# Patient Record
Sex: MALE | Race: WHITE | Employment: FULL TIME | URBAN - METROPOLITAN AREA
[De-identification: names, ages, dates, MRNs, and addresses within clinical notes are randomized per-mention and may not be internally consistent; named-entity substitution may affect disease eponyms.]

---

## 2018-02-26 ENCOUNTER — OFFICE VISIT (OUTPATIENT)
Dept: FAMILY MEDICINE | Facility: CLINIC | Age: 43
End: 2018-02-26

## 2018-02-26 VITALS
OXYGEN SATURATION: 98 % | SYSTOLIC BLOOD PRESSURE: 130 MMHG | BODY MASS INDEX: 28.04 KG/M2 | RESPIRATION RATE: 16 BRPM | DIASTOLIC BLOOD PRESSURE: 82 MMHG | HEART RATE: 65 BPM | TEMPERATURE: 96.8 F | HEIGHT: 72 IN | WEIGHT: 207 LBS

## 2018-02-26 DIAGNOSIS — R07.9 CHEST PAIN, UNSPECIFIED TYPE: ICD-10-CM

## 2018-02-26 DIAGNOSIS — I10 BENIGN ESSENTIAL HYPERTENSION: Primary | ICD-10-CM

## 2018-02-26 PROCEDURE — 99203 OFFICE O/P NEW LOW 30 MIN: CPT | Performed by: PHYSICIAN ASSISTANT

## 2018-02-26 PROCEDURE — 93000 ELECTROCARDIOGRAM COMPLETE: CPT | Performed by: PHYSICIAN ASSISTANT

## 2018-02-26 RX ORDER — LISINOPRIL 10 MG/1
10 TABLET ORAL DAILY
Qty: 30 TABLET | Refills: 5 | Status: SHIPPED | OUTPATIENT
Start: 2018-02-26 | End: 2018-10-20

## 2018-02-26 NOTE — PROGRESS NOTES
"  SUBJECTIVE:   Ramírez Epperson is a 42 year old male who presents to clinic today for the following health issues:      Hypertension Follow-up      Outpatient blood pressures are being checked at home.  Results are patient states that they where high but now reduce.    Low Salt Diet: low salt      Amount of exercise or physical activity: 4-5 days/week for an average of 30-45 minutes    Problems taking medications regularly: No    Medication side effects: none    Diet: regular (no restrictions)            Problem list and histories reviewed & adjusted, as indicated.  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  [facility], and noticed BP was quite high.  Was started on lisinopril and has been rechecked each month.  He did get some labs from them as well.  Everything looked good.      Started on Friday, he has noticed a little bit of chest tightness.  He recently stared a new work out routine.  Denies any SOB, EDEN.      Blood pressure does run in his family.  There is some heart disease as well.      BP Readings from Last 3 Encounters:   02/26/18 130/82    Wt Readings from Last 3 Encounters:   02/26/18 207 lb (93.9 kg)                    Reviewed and updated as needed this visit by clinical staff  Tobacco  Allergies  Meds  Med Hx  Surg Hx  Fam Hx  Soc Hx      Reviewed and updated as needed this visit by Provider         ROS:  Constitutional, HEENT, cardiovascular, pulmonary, gi and gu systems are negative, except as otherwise noted.    OBJECTIVE:     /82  Pulse 65  Temp 96.8  F (36  C) (Oral)  Resp 16  Ht 5' 11.75\" (1.822 m)  Wt 207 lb (93.9 kg)  SpO2 98%  BMI 28.27 kg/m2  Body mass index is 28.27 kg/(m^2).  GENERAL: alert and no distress  EYES: Eyes grossly normal to inspection  HENT: ear canals and TM's normal, nose and mouth without ulcers or lesions  NECK: no adenopathy, no asymmetry, masses, or " scars and thyroid normal to palpation  RESP: lungs clear to auscultation - no rales, rhonchi or wheezes  CV: regular rate and rhythm, normal S1 S2, no S3 or S4, no murmur, click or rub, no peripheral edema and peripheral pulses strong  PSYCH: mentation appears normal, affect normal/bright    Diagnostic Test Results:  EKG - NSR    ASSESSMENT/PLAN:             1. Benign essential hypertension  At goal.  Dietary and lifestyle changes discussed.  Will have him bring in copies of his recent labs.  - EKG 12-lead complete w/read - Clinics  - lisinopril (PRINIVIL/ZESTRIL) 10 MG tablet; Take 1 tablet (10 mg) by mouth daily  Dispense: 30 tablet; Refill: 5    2. Chest pain, unspecified type  Would think this is more muscular.  Normal EKG.  - EKG 12-lead complete w/read - Clinics    Follow up 6 months.      Paul Aguilar PA-C  [facility]

## 2018-02-26 NOTE — MR AVS SNAPSHOT
"              After Visit Summary   2018    Ramírez Epperson    MRN: [id number]           Patient Information     Date Of Birth          1975        Visit Information        Provider Department      2018 10:40 AM Paul Aguilar PA-C [facility]        Today's Diagnoses     Benign essential hypertension    -  1    Chest pain, unspecified type           Follow-ups after your visit        Who to contact     If you have questions or need follow up information about today's clinic visit or your schedule please contact [facility] directly at [phone].  Normal or non-critical lab and imaging results will be communicated to you by Joyenthart, letter or phone within 4 business days after the clinic has received the results. If you do not hear from us within 7 days, please contact the clinic through Joyenthart or phone. If you have a critical or abnormal lab result, we will notify you by phone as soon as possible.  Submit refill requests through GoMoto or call your pharmacy and they will forward the refill request to us. Please allow 3 business days for your refill to be completed.          Additional Information About Your Visit        MyChart Information     GoMoto lets you send messages to your doctor, view your test results, renew your prescriptions, schedule appointments and more. To sign up, go to [email or web address] . Click on \"Log in\" on the left side of the screen, which will take you to the Welcome page. Then click on \"Sign up Now\" on the right side of the page.     You will be asked to enter the access code listed below, as well as some personal information. Please follow the directions to create your username and password.     Your access code is: [email or web address]  Expires: 2018 11:13 AM     Your access code will  in 90 days. If you need help or a new code, please call your [place] clinic or [phone].        Care EveryWhere ID     This is your Care " "EveryWhere ID. This could be used by other organizations to access your [facility] medical records  [id number]        Your Vitals Were     Pulse Temperature Respirations Height Pulse Oximetry BMI (Body Mass Index)    65 96.8  F (36  C) (Oral) 16 5' 11.75\" (1.822 m) 98% 28.27 kg/m2       Blood Pressure from Last 3 Encounters:   02/26/18 130/82    Weight from Last 3 Encounters:   02/26/18 207 lb (93.9 kg)              We Performed the Following     EKG 12-lead complete w/read - Clinics          Today's Medication Changes          These changes are accurate as of 2/26/18 11:13 AM.  If you have any questions, ask your nurse or doctor.               These medicines have changed or have updated prescriptions.        Dose/Directions    * LISINOPRIL PO   This may have changed:  Another medication with the same name was added. Make sure you understand how and when to take each.   Changed by:  Paul Aguilar PA-C        Dose:  10 mg   Take 10 mg by mouth daily   Refills:  0       * lisinopril 10 MG tablet   Commonly known as:  PRINIVIL/ZESTRIL   This may have changed:  You were already taking a medication with the same name, and this prescription was added. Make sure you understand how and when to take each.   Used for:  Benign essential hypertension   Changed by:  Paul Aguilar PA-C        Dose:  10 mg   Take 1 tablet (10 mg) by mouth daily   Quantity:  30 tablet   Refills:  5       * Notice:  This list has 2 medication(s) that are the same as other medications prescribed for you. Read the directions carefully, and ask your doctor or other care provider to review them with you.         Where to get your medicines      These medications were sent to [id number] [place] - [address]  [address] [facility] [address]     Phone:  [phone]     lisinopril 10 MG tablet                Primary Care Provider Office Phone # Fax #    [facility] [phone] [address]  [facility] [address]        Equal Access to Services     ANUPAMESTELLE MAUDE : Hadii payal ku hadprincesso Soshayyali, waaxda luqadaha, qaybta kaalmada betymay, ness nance madiaisha lymanerikawerner rodriguez. So [facility] [phone].    ATENCIÓN: Si habla español, tiene a anderson disposición servicios gratuitos de asistencia lingüística. [facility] [phone].    We comply with applicable federal civil rights laws and [place] laws. We do not discriminate on the basis of race, color, national origin, age, disability, sex, sexual orientation, or gender identity.            Thank you!     Thank you for choosing [facility]  for your care. Our goal is always to provide you with excellent care. Hearing back from our patients is one way we can continue to improve our services. Please take a few minutes to complete the written survey that you may receive in the mail after your visit with us. Thank you!             Your Updated Medication List - Protect others around you: Learn how to safely use, store and throw away your medicines at www.disposemymeds.org.          This list is accurate as of 2/26/18 11:13 AM.  Always use your most recent med list.                   Brand Name Dispense Instructions for use Diagnosis    * LISINOPRIL PO      Take 10 mg by mouth daily        * lisinopril 10 MG tablet    PRINIVIL/ZESTRIL    30 tablet    Take 1 tablet (10 mg) by mouth daily    Benign essential hypertension       * Notice:  This list has 2 medication(s) that are the same as other medications prescribed for you. Read the directions carefully, and ask your doctor or other care provider to review them with you.

## 2018-04-25 ENCOUNTER — TELEPHONE (OUTPATIENT)
Dept: OTHER | Facility: CLINIC | Age: 43
End: 2018-04-25

## 2018-04-26 NOTE — TELEPHONE ENCOUNTER
4/25/2018    Call Regarding Onboarding are Choices    Attempt 1    Message on voicemail     Comments: no dep      Outreach   SV

## 2018-05-22 NOTE — TELEPHONE ENCOUNTER
5/22/2018    Call Regarding Onboarding [facility] CHOICES     Attempt 2    Message on voicemail     Comments:           Outreach   AT

## 2018-06-14 NOTE — TELEPHONE ENCOUNTER
06/13/2018    Call Regarding Onboarding are choices     Attempt 3    Message on voicemail    Comments:       Outreach   Neris Vyas

## 2018-10-20 DIAGNOSIS — I10 BENIGN ESSENTIAL HYPERTENSION: ICD-10-CM

## 2018-10-22 RX ORDER — LISINOPRIL 10 MG/1
TABLET ORAL
Qty: 30 TABLET | Refills: 0 | Status: SHIPPED | OUTPATIENT
Start: 2018-10-22 | End: 2018-11-27

## 2018-10-22 NOTE — TELEPHONE ENCOUNTER
"Medication is being filled for 1 time refill only due to:  Patient needs to be seen because due for follow up and labs.     Note from 2/26/18 OV:  Follow up 6 months.      Keyla Palmer RN    Requested Prescriptions   Signed Prescriptions Disp Refills     lisinopril (PRINIVIL/ZESTRIL) 10 MG tablet 30 tablet 0     Sig: TAKE 1 TABLET (10 MG) BY MOUTH DAILY    ACE Inhibitors (Including Combos) Protocol Failed    10/22/2018  9:41 AM       Failed - Normal serum creatinine on file in past 12 months    No lab results found.         Failed - Normal serum potassium on file in past 12 months    No lab results found.         Passed - Blood pressure under 140/90 in past 12 months    BP Readings from Last 3 Encounters:   02/26/18 130/82                Passed - Recent (12 mo) or future (30 days) visit within the authorizing provider's specialty    Patient had office visit in the last 12 months or has a visit in the next 30 days with authorizing provider or within the authorizing provider's specialty.  See \"Patient Info\" tab in inbasket, or \"Choose Columns\" in Meds & Orders section of the refill encounter.             Passed - Patient is age 18 or older            "

## 2018-11-27 ENCOUNTER — OFFICE VISIT (OUTPATIENT)
Dept: FAMILY MEDICINE | Facility: CLINIC | Age: 43
End: 2018-11-27
Payer: COMMERCIAL

## 2018-11-27 VITALS
HEIGHT: 72 IN | TEMPERATURE: 98.3 F | HEART RATE: 60 BPM | BODY MASS INDEX: 34.31 KG/M2 | DIASTOLIC BLOOD PRESSURE: 92 MMHG | OXYGEN SATURATION: 99 % | WEIGHT: 253.3 LBS | SYSTOLIC BLOOD PRESSURE: 142 MMHG

## 2018-11-27 DIAGNOSIS — M54.41 CHRONIC BILATERAL LOW BACK PAIN WITH RIGHT-SIDED SCIATICA: ICD-10-CM

## 2018-11-27 DIAGNOSIS — G89.29 CHRONIC BILATERAL LOW BACK PAIN WITH RIGHT-SIDED SCIATICA: ICD-10-CM

## 2018-11-27 DIAGNOSIS — I10 BENIGN ESSENTIAL HYPERTENSION: Primary | ICD-10-CM

## 2018-11-27 LAB
BASOPHILS # BLD AUTO: 0 10E9/L (ref 0–0.2)
BASOPHILS NFR BLD AUTO: 0.5 %
DIFFERENTIAL METHOD BLD: ABNORMAL
EOSINOPHIL # BLD AUTO: 0.2 10E9/L (ref 0–0.7)
EOSINOPHIL NFR BLD AUTO: 4.1 %
ERYTHROCYTE [DISTWIDTH] IN BLOOD BY AUTOMATED COUNT: 12.6 % (ref 10–15)
HCT VFR BLD AUTO: 45 % (ref 40–53)
HGB BLD-MCNC: 15.3 G/DL (ref 13.3–17.7)
LYMPHOCYTES # BLD AUTO: 1.3 10E9/L (ref 0.8–5.3)
LYMPHOCYTES NFR BLD AUTO: 32.1 %
MCH RBC QN AUTO: 29.4 PG (ref 26.5–33)
MCHC RBC AUTO-ENTMCNC: 34 G/DL (ref 31.5–36.5)
MCV RBC AUTO: 87 FL (ref 78–100)
MONOCYTES # BLD AUTO: 0.5 10E9/L (ref 0–1.3)
MONOCYTES NFR BLD AUTO: 12 %
NEUTROPHILS # BLD AUTO: 2 10E9/L (ref 1.6–8.3)
NEUTROPHILS NFR BLD AUTO: 51.3 %
PLATELET # BLD AUTO: 210 10E9/L (ref 150–450)
RBC # BLD AUTO: 5.2 10E12/L (ref 4.4–5.9)
WBC # BLD AUTO: 3.9 10E9/L (ref 4–11)

## 2018-11-27 PROCEDURE — 80053 COMPREHEN METABOLIC PANEL: CPT | Performed by: PHYSICIAN ASSISTANT

## 2018-11-27 PROCEDURE — 36415 COLL VENOUS BLD VENIPUNCTURE: CPT | Performed by: PHYSICIAN ASSISTANT

## 2018-11-27 PROCEDURE — 84443 ASSAY THYROID STIM HORMONE: CPT | Performed by: PHYSICIAN ASSISTANT

## 2018-11-27 PROCEDURE — 99214 OFFICE O/P EST MOD 30 MIN: CPT | Performed by: PHYSICIAN ASSISTANT

## 2018-11-27 PROCEDURE — 85025 COMPLETE CBC W/AUTO DIFF WBC: CPT | Performed by: PHYSICIAN ASSISTANT

## 2018-11-27 RX ORDER — METHYLPREDNISOLONE 4 MG
TABLET, DOSE PACK ORAL
Qty: 21 TABLET | Refills: 0 | Status: SHIPPED | OUTPATIENT
Start: 2018-11-27 | End: 2019-05-30

## 2018-11-27 RX ORDER — LISINOPRIL 10 MG/1
TABLET ORAL
Qty: 90 TABLET | Refills: 1 | Status: SHIPPED | OUTPATIENT
Start: 2018-11-27 | End: 2019-05-08

## 2018-11-27 NOTE — PROGRESS NOTES
SUBJECTIVE:   Ramírez Epperson is a 42 year old male who presents to clinic today for the following health issues:      Hypertension Follow-up      Outpatient blood pressures are not being checked.    Low Salt Diet: no added salt      Amount of exercise or physical activity: 4-5 days/week for an average of 45-60 minutes    Problems taking medications regularly: No    Medication side effects: none    Diet: regular (no restrictions)      Back Pain       Duration: 3-4 years         Specific cause: bulging disc     Description:   Location of pain: low back both  Character of pain: sharp, dull ache, stabbing, gnawing and constant  Pain radiation:radiates into the right buttocks and radiates into the right leg  New numbness or weakness in legs, not attributed to pain:  no     Intensity: Currently 7/10, At its worst 10/10    History:   Pain interferes with job: YES  History of back problems: recurrent self limited episodes of low back pain in the past  Any previous MRI or X-rays: Yes, both in past   Sees a specialist for back pain:  No  Therapies tried without relief: steroid injection    Alleviating factors:   Improved by: acupuncture, chiropractor and Physical Therapy      Precipitating factors:  Worsened by: Lifting, Bending, Standing, Sitting, Lying Flat and Walking          Accompanying Signs & Symptoms:  Risk of Fracture:  None  Risk of Cauda Equina:  None  Risk of Infection:  None  Risk of Cancer:  None  Risk of Ankylosing Spondylitis:  Onset at age <35, male, AND morning back stiffness. no                  Problem list and histories reviewed & adjusted, as indicated.  Additional history: 41 y/o male here for 9 month follow up.  I did see him as NP in Feb where he was new onset HYPERTENSION from urgent care.  Was started on lisinopril.  He has continued the medication.  Does not check pressures.  He has been out of medication for the last several days.        BP Readings from Last 3 Encounters:   11/27/18 (!)  "142/92   02/26/18 130/82    Wt Readings from Last 3 Encounters:   11/27/18 253 lb 4.8 oz (114.9 kg)   02/26/18 207 lb (93.9 kg)                    Reviewed and updated as needed this visit by clinical staff  Allergies  Meds       Reviewed and updated as needed this visit by Provider         ROS:  Constitutional, HEENT, cardiovascular, pulmonary, gi and gu systems are negative, except as otherwise noted.    OBJECTIVE:     BP (!) 142/92  Pulse 60  Temp 98.3  F (36.8  C) (Oral)  Ht 5' 11.75\" (1.822 m)  Wt 253 lb 4.8 oz (114.9 kg)  SpO2 99%  BMI 34.59 kg/m2  Body mass index is 34.59 kg/(m^2).  GENERAL: alert and no distress  EYES: Eyes grossly normal to inspection  HENT: ear canals and TM's normal, nose and mouth without ulcers or lesions  NECK: no adenopathy, no asymmetry, masses, or scars, thyroid normal to palpation and no carotid bruits  RESP: lungs clear to auscultation - no rales, rhonchi or wheezes  CV: regular rate and rhythm, normal S1 S2, no S3 or S4, no murmur, click or rub, no peripheral edema and peripheral pulses strong  MS: limited active ROM lumbar spine with flexion, extension and rotation.  Pain with seated straight leg raise on right side  NEURO: Normal strength and tone, mentation intact and speech normal  PSYCH: mentation appears normal, affect normal/bright    Diagnostic Test Results:  none     ASSESSMENT/PLAN:         1. Benign essential hypertension  Not quite to goal, but has been out of meds for the last several days.  Encourage him to follow up in 2-4 weeks for BP only check.  - CBC with platelets differential  - Comprehensive metabolic panel  - TSH with free T4 reflex  - lisinopril (PRINIVIL/ZESTRIL) 10 MG tablet; TAKE 1 TABLET (10 MG) BY MOUTH DAILY  Dispense: 90 tablet; Refill: 1    2. Chronic bilateral low back pain with right-sided sciatica  Already working with chiro/PT    - CBC with platelets differential  - ORTHO  REFERRAL  - methylPREDNISolone (MEDROL DOSEPAK) 4 MG " tablet; Follow package instructions  Dispense: 21 tablet; Refill: 0        Paul Aguilar PA-C  [facility]

## 2018-11-27 NOTE — MR AVS SNAPSHOT
After Visit Summary   11/27/2018    Ramírez Epperson    MRN: [id number]           Patient Information     Date Of Birth          1975        Visit Information        Provider Department      11/27/2018 1:00 PM Paul Aguilar PA-C [facility]        Today's Diagnoses     Benign essential hypertension    -  1    Chronic bilateral low back pain with right-sided sciatica           Follow-ups after your visit        Additional Services     ORTHO  REFERRAL       [facility] is referring you to the Orthopedic  Services at [facility].       The  Representative will assist you in the coordination of your Orthopedic and Musculoskeletal Care as prescribed by your physician.    The  Representative will call you within 1 business day to help schedule your appointment, or you may contact the [facility] include [address] beds at the [facility] Representative at:    All areas ~ [phone]     Type of Referral : Spine: Lumbar: Medical Spine/Non-Surgical Specialist        Timeframe requested: Within 2 weeks    Coverage of these services is subject to the terms and limitations of your health insurance plan.  Please call member services at your health plan with any benefit or coverage questions.      If X-rays, CT or MRI's have been performed, please contact the facility where they were done to arrange for , prior to your scheduled appointment.  Please bring this referral request to your appointment and present it to your specialist.                  Who to contact     If you have questions or need follow up information about today's clinic visit or your schedule please contact [facility] directly at [phone].  Normal or non-critical lab and imaging results will be communicated to you by MyChart, letter or phone within 4 business days after the clinic has received the results. If you do not hear from us within 7 days, please contact the  "clinic through [employer] Sway[employer] or phone. If you have a critical or abnormal lab result, we will notify you by phone as soon as possible.  Submit refill requests through cube19 or call your pharmacy and they will forward the refill request to us. Please allow 3 business days for your refill to be completed.          Additional Information About Your Visit        [employer] Information     [email or web address] lets you send messages to your doctor, view your test results, renew your prescriptions, schedule appointments and more. To sign up, go to [email or web address] . Click on \"Log in\" on the left side of the screen, which will take you to the Welcome page. Then click on \"Sign up Now\" on the right side of the page.     You will be asked to enter the access code listed below, as well as some personal information. Please follow the directions to create your username and password.     Your access code is: [email or web address]  Expires: 2019 12:50 PM     Your access code will  in 90 days. If you need help or a new code, please call your [place] clinic or [phone].        Care EveryWhere ID     This is your Care EveryWhere ID. This could be used by other organizations to access your [place] medical records  [id number]        Your Vitals Were     Pulse Temperature Height Pulse Oximetry BMI (Body Mass Index)       60 98.3  F (36.8  C) (Oral) 5' 11.75\" (1.822 m) 99% 34.59 kg/m2        Blood Pressure from Last 3 Encounters:   18 (!) 142/92   18 130/82    Weight from Last 3 Encounters:   18 253 lb 4.8 oz (114.9 kg)   18 207 lb (93.9 kg)              We Performed the Following     CBC with platelets differential     Comprehensive metabolic panel     ORTHO  REFERRAL     TSH with free T4 reflex          Today's Medication Changes          These changes are accurate as of 18  1:19 PM.  If you have any questions, ask your nurse or doctor.               Start taking these medicines.        " Dose/Directions    methylPREDNISolone 4 MG tablet   Commonly known as:  MEDROL DOSEPAK   Used for:  Chronic bilateral low back pain with right-sided sciatica   Started by:  Paul Aguilar PA-C        Follow package instructions   Quantity:  21 tablet   Refills:  0            Where to get your medicines      These medications were sent to [facility] [address] [facility] - [place], [place] - [address]  [address], [place] [address]     Phone:  [phone]     lisinopril 10 MG tablet    methylPREDNISolone 4 MG tablet                Primary Care Provider Office Phone # Fax #    Paul Aguilar PA-C [phone] [address] [facility] [address]  [facility] [address]        Equal Access to Services     ESTELLE SANTORO : Hadii payal garzon hadasho Soshayyali, waaxda luqadaha, qaybta kaalmada adeegyada, ness priest . So [facility] [phone].    ATENCIÓN: Si habla español, tiene a anderson disposición servicios gratuitos de asistencia lingüística. LlDiley [facility] [phone].    We comply with applicable federal civil rights laws and [place] laws. We do not discriminate on the basis of race, color, national origin, age, disability, sex, sexual orientation, or gender identity.            Thank you!     Thank you for choosing [facility]  for your care. Our goal is always to provide you with excellent care. Hearing back from our patients is one way we can continue to improve our services. Please take a few minutes to complete the written survey that you may receive in the mail after your visit with us. Thank you!             Your Updated Medication List - Protect others around you: Learn how to safely use, store and throw away your medicines at www.disposemymeds.org.          This list is accurate as of 11/27/18  1:19 PM.  Always use your most recent med list.                   Brand Name Dispense Instructions for use Diagnosis    * LISINOPRIL PO      Take 10 mg by mouth daily        *  lisinopril 10 MG tablet    PRINIVIL/ZESTRIL    90 tablet    TAKE 1 TABLET (10 MG) BY MOUTH DAILY    Benign essential hypertension       methylPREDNISolone 4 MG tablet    MEDROL DOSEPAK    21 tablet    Follow package instructions    Chronic bilateral low back pain with right-sided sciatica       * Notice:  This list has 2 medication(s) that are the same as other medications prescribed for you. Read the directions carefully, and ask your doctor or other care provider to review them with you.

## 2018-11-28 LAB
ALBUMIN SERPL-MCNC: 4 G/DL (ref 3.4–5)
ALP SERPL-CCNC: 71 U/L (ref 40–150)
ALT SERPL W P-5'-P-CCNC: 81 U/L (ref 0–70)
ANION GAP SERPL CALCULATED.3IONS-SCNC: 6 MMOL/L (ref 3–14)
AST SERPL W P-5'-P-CCNC: 37 U/L (ref 0–45)
BILIRUB SERPL-MCNC: 0.4 MG/DL (ref 0.2–1.3)
BUN SERPL-MCNC: 14 MG/DL (ref 7–30)
CALCIUM SERPL-MCNC: 8.9 MG/DL (ref 8.5–10.1)
CHLORIDE SERPL-SCNC: 107 MMOL/L (ref 94–109)
CO2 SERPL-SCNC: 26 MMOL/L (ref 20–32)
CREAT SERPL-MCNC: 0.96 MG/DL (ref 0.66–1.25)
GFR SERPL CREATININE-BSD FRML MDRD: 85 ML/MIN/1.7M2
GLUCOSE SERPL-MCNC: 84 MG/DL (ref 70–99)
POTASSIUM SERPL-SCNC: 4.2 MMOL/L (ref 3.4–5.3)
PROT SERPL-MCNC: 7.5 G/DL (ref 6.8–8.8)
SODIUM SERPL-SCNC: 139 MMOL/L (ref 133–144)
TSH SERPL DL<=0.005 MIU/L-ACNC: 1.34 MU/L (ref 0.4–4)

## 2018-11-29 ENCOUNTER — TELEPHONE (OUTPATIENT)
Dept: FAMILY MEDICINE | Facility: CLINIC | Age: 43
End: 2018-11-29

## 2018-11-29 NOTE — PROGRESS NOTES
Please call with results.    Overall labs look ok.  No sign of diabetes or thyroid disease.  One of his liver enzymes is up just a bit, and we see this when people have put on weight like he has.  This should resolv as he losses it, and will be rechecked at his next visit.      Abdirahman Aguilar PA-C

## 2018-11-29 NOTE — TELEPHONE ENCOUNTER
Patient returned call to triage.  He would like a call back today if possible.    Patient can be reached at:  [phone]  OK to leave VM

## 2018-11-29 NOTE — TELEPHONE ENCOUNTER
LVM for pt to call back to triage to review labs. See below copied and pasted from lab notes.    Paul Aguilar PA-C  P Up Triage                   Please call with results.     Overall labs look ok.  No sign of diabetes or thyroid disease.  One of his liver enzymes is up just a bit, and we see this when people have put on weight like he has.  This should resolv as he losses it, and will be rechecked at his next visit.       Abdirahman Aguilar PA-C

## 2018-11-29 NOTE — LETTER
Ramírez Epperson  [address]   [facility] [address]    :  1975  MRN:  [id number]    Dear Ramírez,    This letter is to report the test results from your most recent visit.    Overall your labs look ok. No sign of diabetes or thyroid disease. ONe of your liver enzymes is up a bit, and we see this when people have put on weight. This should resolve as you loose weight and we will recheck this at your next visit.     Results for orders placed or performed in visit on 18   CBC with platelets differential   Result Value Ref Range    WBC 3.9 (L) 4.0 - 11.0 10e9/L    RBC Count 5.20 4.4 - 5.9 10e12/L    Hemoglobin 15.3 13.3 - 17.7 g/dL    Hematocrit 45.0 40.0 - 53.0 %    MCV 87 78 - 100 fl    MCH 29.4 26.5 - 33.0 pg    MCHC 34.0 31.5 - 36.5 g/dL    RDW 12.6 10.0 - 15.0 %    Platelet Count 210 150 - 450 10e9/L    % Neutrophils 51.3 %    % Lymphocytes 32.1 %    % Monocytes 12.0 %    % Eosinophils 4.1 %    % Basophils 0.5 %    Absolute Neutrophil 2.0 1.6 - 8.3 10e9/L    Absolute Lymphocytes 1.3 0.8 - 5.3 10e9/L    Absolute Monocytes 0.5 0.0 - 1.3 10e9/L    Absolute Eosinophils 0.2 0.0 - 0.7 10e9/L    Absolute Basophils 0.0 0.0 - 0.2 10e9/L    Diff Method Automated Method    Comprehensive metabolic panel   Result Value Ref Range    Sodium 139 133 - 144 mmol/L    Potassium 4.2 3.4 - 5.3 mmol/L    Chloride 107 94 - 109 mmol/L    Carbon Dioxide 26 20 - 32 mmol/L    Anion Gap 6 3 - 14 mmol/L    Glucose 84 70 - 99 mg/dL    Urea Nitrogen 14 7 - 30 mg/dL    Creatinine 0.96 0.66 - 1.25 mg/dL    GFR Estimate 85 >60 mL/min/1.7m2    GFR Estimate If Black >90 >60 mL/min/1.7m2    Calcium 8.9 8.5 - 10.1 mg/dL    Bilirubin Total 0.4 0.2 - 1.3 mg/dL    Albumin 4.0 3.4 - 5.0 g/dL    Protein Total 7.5 6.8 - 8.8 g/dL    Alkaline Phosphatase 71 40 - 150 U/L    ALT 81 (H) 0 - 70 U/L    AST 37 0 - 45 U/L   TSH with free T4 reflex   Result Value Ref Range    TSH 1.34 0.40 - 4.00 mU/L

## 2018-12-19 ENCOUNTER — OFFICE VISIT (OUTPATIENT)
Dept: PALLIATIVE MEDICINE | Facility: CLINIC | Age: 43
End: 2018-12-19
Payer: COMMERCIAL

## 2018-12-19 VITALS
DIASTOLIC BLOOD PRESSURE: 94 MMHG | BODY MASS INDEX: 34.55 KG/M2 | SYSTOLIC BLOOD PRESSURE: 139 MMHG | WEIGHT: 253 LBS | OXYGEN SATURATION: 96 % | HEART RATE: 66 BPM

## 2018-12-19 DIAGNOSIS — M47.817 LUMBOSACRAL SPONDYLOSIS WITHOUT MYELOPATHY: Primary | ICD-10-CM

## 2018-12-19 PROCEDURE — 99204 OFFICE O/P NEW MOD 45 MIN: CPT | Performed by: PHYSICAL MEDICINE & REHABILITATION

## 2018-12-19 RX ORDER — METHOCARBAMOL 500 MG/1
750 TABLET, FILM COATED ORAL
Qty: 45 TABLET | Refills: 0 | Status: SHIPPED | OUTPATIENT
Start: 2018-12-19 | End: 2019-05-30

## 2018-12-19 ASSESSMENT — PAIN SCALES - GENERAL: PAINLEVEL: SEVERE PAIN (6)

## 2018-12-19 NOTE — PATIENT INSTRUCTIONS
1. I think your back pain is likely related to some arthritic changes in your lumbar spine (low back). Reviewing your MRI will help confirm this so work on getting those MRI images over to my clinic.    2. I prescribed a muscle relaxant for you to take at night when your pain flares. The name is methocarbamol, 500mg tablets. Try 500mg initially and make sure you are not feeling tired or drowsy the next morning. If this dose is helpful, continue at 500mg. If it isn't helpful and you are not having side effects, increase to 750mg at night.    3. I prescribed voltaren 50mg twice daily as needed. Take this on days where you anticipate a lot of activity. Do not take any other over the counter pain medications other than tylenol with this medication.    4. I will follow up with you after I review your MRI images.      ----------------------------------------------------------------  Clinic Number:  [phone]   Call this number with any questions about your care and for scheduling assistance. Calls are returned Monday through Friday between 8 AM and 4:30 PM. We usually get back to you within 2 business days depending on the issue/request.       Medication refills:    For non-narcotic medications, call your pharmacy directly to request a refill. The pharmacy will contact the [facility] for authorization. Please allow 3-4 days for these refills to be processed.     For narcotic refills, call the clinic number or send a [employer] message. Please contact us 7-10 days before your refill is due. The message MUST include the name of the specific medication(s) requested and how you would like to receive the prescription(s). The options are as follows:    Pain Clinic staff can mail the prescription to your pharmacy. Please tell us the name of the pharmacy.    You may pick the prescription up at the Pain Clinic (tell us the location) or during a clinic visit with your pain provider    Pain Clinic staff can deliver the  prescription to the [facility] pharmacy in the clinic building. Please tell us the location.      We believe regular attendance is key to your success in our program.    Any time you are unable to keep your appointment we ask that you call us at least 24 hours in advance to let us know. This will allow us to offer the appointment time to another patient.

## 2019-05-08 DIAGNOSIS — I10 BENIGN ESSENTIAL HYPERTENSION: ICD-10-CM

## 2019-05-08 NOTE — TELEPHONE ENCOUNTER
"Routing refill request to provider for review/approval because:  Labs out of range:  BP    Requested Prescriptions   Pending Prescriptions Disp Refills     lisinopril (PRINIVIL/ZESTRIL) 10 MG tablet [Pharmacy Med Name: LISINOPRIL 10 MG TABLET] 90 tablet 1     Sig: TAKE 1 TABLET BY MOUTH EVERY DAY       ACE Inhibitors (Including Combos) Protocol Failed - 5/8/2019  1:46 PM        Failed - Blood pressure under 140/90 in past 12 months     BP Readings from Last 3 Encounters:   12/19/18 (!) 139/94   11/27/18 (!) 142/92   02/26/18 130/82                 Passed - Recent (12 mo) or future (30 days) visit within the authorizing provider's specialty     Patient had office visit in the last 12 months or has a visit in the next 30 days with authorizing provider or within the authorizing provider's specialty.  See \"Patient Info\" tab in inbasket, or \"Choose Columns\" in Meds & Orders section of the refill encounter.              Passed - Medication is active on med list        Passed - Patient is age 18 or older        Passed - Normal serum creatinine on file in past 12 months     Recent Labs   Lab Test 11/27/18  1320   CR 0.96             Passed - Normal serum potassium on file in past 12 months     Recent Labs   Lab Test 11/27/18  1320   POTASSIUM 4.2             Roselia García RN  "

## 2019-05-09 RX ORDER — LISINOPRIL 10 MG/1
TABLET ORAL
Qty: 90 TABLET | Refills: 0 | Status: SHIPPED | OUTPATIENT
Start: 2019-05-09 | End: 2019-08-15

## 2019-05-18 ENCOUNTER — TRANSFERRED RECORDS (OUTPATIENT)
Dept: HEALTH INFORMATION MANAGEMENT | Facility: CLINIC | Age: 44
End: 2019-05-18

## 2019-05-18 ENCOUNTER — HOSPITAL ENCOUNTER (EMERGENCY)
Facility: CLINIC | Age: 44
Discharge: HOME | End: 2019-05-18
Attending: EMERGENCY MEDICINE | Admitting: EMERGENCY MEDICINE
Payer: COMMERCIAL

## 2019-05-18 ENCOUNTER — APPOINTMENT (OUTPATIENT)
Dept: GENERAL RADIOLOGY | Facility: CLINIC | Age: 44
End: 2019-05-18
Attending: EMERGENCY MEDICINE
Payer: COMMERCIAL

## 2019-05-18 VITALS
DIASTOLIC BLOOD PRESSURE: 102 MMHG | WEIGHT: 255 LBS | BODY MASS INDEX: 34.83 KG/M2 | TEMPERATURE: 98.4 F | RESPIRATION RATE: 23 BRPM | SYSTOLIC BLOOD PRESSURE: 152 MMHG | HEART RATE: 73 BPM | OXYGEN SATURATION: 97 %

## 2019-05-18 DIAGNOSIS — R07.9 CHEST PAIN, UNSPECIFIED TYPE: ICD-10-CM

## 2019-05-18 DIAGNOSIS — I10 BENIGN ESSENTIAL HYPERTENSION: ICD-10-CM

## 2019-05-18 DIAGNOSIS — K21.9 GASTROESOPHAGEAL REFLUX DISEASE WITHOUT ESOPHAGITIS: ICD-10-CM

## 2019-05-18 LAB
ALBUMIN SERPL-MCNC: 3.9 G/DL (ref 3.4–5)
ALP SERPL-CCNC: 70 U/L (ref 40–150)
ALT SERPL W P-5'-P-CCNC: 43 U/L (ref 0–70)
ANION GAP SERPL CALCULATED.3IONS-SCNC: 8 MMOL/L (ref 3–14)
AST SERPL W P-5'-P-CCNC: 18 U/L (ref 0–45)
BASOPHILS # BLD AUTO: 0 10E9/L (ref 0–0.2)
BASOPHILS NFR BLD AUTO: 0.2 %
BILIRUB SERPL-MCNC: 0.7 MG/DL (ref 0.2–1.3)
BUN SERPL-MCNC: 13 MG/DL (ref 7–30)
CALCIUM SERPL-MCNC: 9 MG/DL (ref 8.5–10.1)
CHLORIDE SERPL-SCNC: 106 MMOL/L (ref 94–109)
CO2 SERPL-SCNC: 24 MMOL/L (ref 20–32)
CREAT SERPL-MCNC: 1.16 MG/DL (ref 0.66–1.25)
DIFFERENTIAL METHOD BLD: NORMAL
EOSINOPHIL # BLD AUTO: 0.1 10E9/L (ref 0–0.7)
EOSINOPHIL NFR BLD AUTO: 1 %
ERYTHROCYTE [DISTWIDTH] IN BLOOD BY AUTOMATED COUNT: 12.3 % (ref 10–15)
GFR SERPL CREATININE-BSD FRML MDRD: 76 ML/MIN/{1.73_M2}
GLUCOSE SERPL-MCNC: 92 MG/DL (ref 70–99)
HCT VFR BLD AUTO: 40.7 % (ref 40–53)
HGB BLD-MCNC: 14 G/DL (ref 13.3–17.7)
IMM GRANULOCYTES # BLD: 0 10E9/L (ref 0–0.4)
IMM GRANULOCYTES NFR BLD: 0.2 %
INTERPRETATION ECG - MUSE: NORMAL
LIPASE SERPL-CCNC: 130 U/L (ref 73–393)
LYMPHOCYTES # BLD AUTO: 0.9 10E9/L (ref 0.8–5.3)
LYMPHOCYTES NFR BLD AUTO: 16.8 %
MCH RBC QN AUTO: 29 PG (ref 26.5–33)
MCHC RBC AUTO-ENTMCNC: 34.4 G/DL (ref 31.5–36.5)
MCV RBC AUTO: 84 FL (ref 78–100)
MONOCYTES # BLD AUTO: 0.4 10E9/L (ref 0–1.3)
MONOCYTES NFR BLD AUTO: 7.6 %
NEUTROPHILS # BLD AUTO: 3.8 10E9/L (ref 1.6–8.3)
NEUTROPHILS NFR BLD AUTO: 74.2 %
NRBC # BLD AUTO: 0 10*3/UL
NRBC BLD AUTO-RTO: 0 /100
PLATELET # BLD AUTO: 211 10E9/L (ref 150–450)
POTASSIUM SERPL-SCNC: 3.8 MMOL/L (ref 3.4–5.3)
PROT SERPL-MCNC: 7.1 G/DL (ref 6.8–8.8)
RBC # BLD AUTO: 4.82 10E12/L (ref 4.4–5.9)
SODIUM SERPL-SCNC: 138 MMOL/L (ref 133–144)
TROPONIN I SERPL-MCNC: <0.015 UG/L (ref 0–0.04)
TROPONIN I SERPL-MCNC: <0.015 UG/L (ref 0–0.04)
WBC # BLD AUTO: 5.1 10E9/L (ref 4–11)

## 2019-05-18 PROCEDURE — 25000132 ZZH RX MED GY IP 250 OP 250 PS 637: Performed by: EMERGENCY MEDICINE

## 2019-05-18 PROCEDURE — C9113 INJ PANTOPRAZOLE SODIUM, VIA: HCPCS | Performed by: EMERGENCY MEDICINE

## 2019-05-18 PROCEDURE — 85025 COMPLETE CBC W/AUTO DIFF WBC: CPT | Performed by: EMERGENCY MEDICINE

## 2019-05-18 PROCEDURE — 80053 COMPREHEN METABOLIC PANEL: CPT | Performed by: EMERGENCY MEDICINE

## 2019-05-18 PROCEDURE — 83690 ASSAY OF LIPASE: CPT | Performed by: EMERGENCY MEDICINE

## 2019-05-18 PROCEDURE — 96374 THER/PROPH/DIAG INJ IV PUSH: CPT

## 2019-05-18 PROCEDURE — 99285 EMERGENCY DEPT VISIT HI MDM: CPT | Mod: 25

## 2019-05-18 PROCEDURE — 25000125 ZZHC RX 250: Performed by: EMERGENCY MEDICINE

## 2019-05-18 PROCEDURE — 93005 ELECTROCARDIOGRAM TRACING: CPT

## 2019-05-18 PROCEDURE — 84484 ASSAY OF TROPONIN QUANT: CPT | Performed by: EMERGENCY MEDICINE

## 2019-05-18 PROCEDURE — 25000128 H RX IP 250 OP 636: Performed by: EMERGENCY MEDICINE

## 2019-05-18 PROCEDURE — 71046 X-RAY EXAM CHEST 2 VIEWS: CPT

## 2019-05-18 RX ORDER — SUCRALFATE ORAL 1 G/10ML
1 SUSPENSION ORAL 4 TIMES DAILY
Qty: 420 ML | Refills: 0 | Status: SHIPPED | OUTPATIENT
Start: 2019-05-18 | End: 2019-05-30

## 2019-05-18 RX ADMIN — PANTOPRAZOLE SODIUM 40 MG: 40 INJECTION, POWDER, FOR SOLUTION INTRAVENOUS at 19:38

## 2019-05-18 RX ADMIN — LIDOCAINE HYDROCHLORIDE 30 ML: 20 SOLUTION ORAL; TOPICAL at 19:38

## 2019-05-18 ASSESSMENT — ENCOUNTER SYMPTOMS
FEVER: 0
VOMITING: 0
SHORTNESS OF BREATH: 1
CHILLS: 0
ABDOMINAL PAIN: 1
NAUSEA: 0

## 2019-05-18 NOTE — ED PROVIDER NOTES
History     Chief Complaint:  Chest pain    HPI   Ramírez Epperson is a 43 year old male who presents to the ED via EMS with chest pain. The patient states that he has been having acid reflux symptoms for the past few days including a warm sensation in his throat, a sour taste in his mouth, and acidic burps. The patient states he felt tightness in his chest this morning, radiating to his left bicep area. The patient reports that he drove to [place] to  a soccer game this morning and took Tums, which alleviated the acid reflux symptoms. However, after the soccer game this afternoon, the patient notes his chest pain returned, this time not associated with any acid reflux sensation, so he tried taking Tums again without relief. At that time, he notes he went to Urgent Care in [place], [place] where they obtained an ECG and recommended he come to an emergency room for further evaluation. The patient was transported to the ED via EMS who gave him 324 mg Aspirin and a Nitroglycerin spray with total resolution of his symptoms.     CARDIAC RISK FACTORS:  Sex:    Male  Tobacco:   No  Hypertension:   Yes  Hyperlipidemia:  Yes - not medicated  Diabetes:   No  Family History:  No    PE/DVT RISK FACTORS:  Sex:    Male  Hormones:   No  Tobacco:   No  Cancer:   No  Travel:   No  Surgery:   No  Other immobilization: No  Personal history:  No  Family history:  No    Allergies:  No known drug allergies    Medications:    Lisinopril    Past Medical History:    Hypertension    Past Surgical History:    History reviewed. No pertinent surgical history.    Family History:    Hypertension    Social History:  Smoking status: No  Alcohol use: Yes, once per month  Drug use: No  PCP: Paul Aguilar  Presents to the ED with his parents  Marital Status: Single [1]     Review of Systems   Constitutional: Negative for chills and fever.   Respiratory: Positive for shortness of breath.    Cardiovascular: Positive for chest pain.    Gastrointestinal: Positive for abdominal pain. Negative for nausea and vomiting.   All other systems reviewed and are negative.    Physical Exam     Patient Vitals for the past 24 hrs:   BP Temp Temp src Pulse Heart Rate Resp SpO2 Weight   05/18/19 2025 (!) 152/102 -- -- 73 70 23 97 % --   05/18/19 1900 -- 98.4  F (36.9  C) Temporal -- 74 8 99 % --   05/18/19 1842 -- -- -- -- 79 16 100 % --   05/18/19 1841 (!) 137/94 -- -- 75 -- -- -- --   05/18/19 1813 148/87 -- -- 85 -- 18 98 % 115.7 kg (255 lb)     Physical Exam  General: Patient is alert and normal appearing.  HEENT: Head atraumatic    Eyes: pupils equal and reactive. Conjunctiva clear   Nares: patent   Oropharynx: no lesions, uvula midline, no palatal draping, normal voice, no trismus  Neck: Supple without lymphadenopathy, no meningismus  Chest: Heart regular rate and rhythm.   Lungs: Equal clear to auscultation with no wheeze or rales  Abdomen: Soft, non tender, nondistended, normal bowel sounds  Back: No costovertebral angle tenderness, no midline C, T or L spine tenderness  Neuro: Grossly nonfocal, normal speech, strength equal bilaterally, CN 2-12 intact  Extremities: No deformities, equal radial and DP pulses. No clubbing, cyanosis.  No edema  Skin: Warm and dry with no rash.       Emergency Department Course   ECG (18:42:04):  Rate 79 bpm. ME interval 146. QRS duration 106. QT/QTc 380/435. P-R-T axes 36 29 26. Normal sinus rhythm. Normal ECG. Interpreted at 1850 by Vicki Mac MD.    Imaging:  Radiographic findings were communicated with the patient who voiced understanding of the findings.    XR Chest 2 Views  No evidence of acute cardiopulmonary disease is seen  As read by Radiology.    Laboratory:  CBC: WNL (WBC 5.1, HGB 14.0, )  CMP: (Creatinine 1.16)  Troponin (1831): <0.015  Troponin (2023): <0.015  Lipase: 130    Interventions:  1938: GI Cocktail - Maalox 15 mL, Viscous Lidocaine 15 mL, 30 mL suspension PO  1938: Pantoprazole 40mg  IV    Emergency Department Course:  The patient arrived in the emergency department via EMS.  Past medical records, nursing notes, and vitals reviewed.  1812: I performed an exam of the patient and obtained history, as documented above.  ECG performed, results above.  IV inserted and blood drawn. The patient was placed on continuous cardiac monitoring and pulse oximetry.  The patient was sent for a chest x-ray while in the emergency department, findings above.    2100: I rechecked the patient. Findings and plan explained to the patient. Patient discharged home with instructions regarding supportive care, medications, and reasons to return. The importance of close follow-up was reviewed.     Impression & Plan    Medical Decision Making:  Ramírez pEperson is a 43 year old male who presents to the ED for evaluation of intermittent chest pain over the last week.  Patient's chest pain began early this morning when he was driving to [place] and then persisted throughout the day.  He set up for 6 hours of continuous pain.  His troponin is negative and delta troponin is negative.  EKG shows no acute ischemic changes.  Patient describes as reflux type sensation including a burning pain up to his throat with sour taste intermittently over the last week that will be relieved by Tums.  This is in addition to the chest pain that he related today.  Patient does have some mild cardiac risk factors including hypertension hyperlipidemia.  Patient denies smoking or family history of heart disease.  Patient was completely relieved of pain following GI cocktail and pantoprazole here in the emergency department.  Discussed with patient that I think he needs a cardiac stress test for provocative testing to complete his work-up.  I have ordered this for him as an outpatient.  No evidence of acute MI at this time.  He will be given medications to control his acid including Prilosec and sucralfate.  Return precautions the emergency  department prior to outpatient cardiac stress testing were discussed at length with the patient.  He and his family members are at bedside and are agreeable with the management plan and all questions and concerns addressed.    Diagnosis:    ICD-10-CM   1. Benign essential hypertension I10       2. Chest pain, unspecified type R07.9   3. Gastroesophageal reflux disease without esophagitis K21.9     Disposition: Discharged to home    Discharge Medications:  omeprazole 20 MG DR capsule  Commonly known as:  priLOSEC  20 mg, Oral, DAILY     sucralfate 1 GM/10ML suspension  Commonly known as:  CARAFATE  1 g, Oral, 4 TIMES DAILY       Aminata PETE, am serving as a scribe at 6:12 PM on 5/18/2019 to document services personally performed by Vicki Mac MD based on my observations and the provider's statements to me.     Aminata Dunham  5/18/2019    EMERGENCY DEPARTMENT     Vicki Mac MD  05/18/19 0308

## 2019-05-18 NOTE — ED AVS SNAPSHOT
Emergency Department  [address] [place] [id number]  Phone:  [phone]  Fax:  [phone]                                    Ramírez Epperson   MRN: [id number]    Department:   Emergency Department   Date of Visit:  5/18/2019           After Visit Summary Signature Page    I have received my discharge instructions, and my questions have been answered. I have discussed any challenges I see with this plan with the nurse or doctor.    ..........................................................................................................................................  Patient/Patient Representative Signature      ..........................................................................................................................................  Patient Representative Print Name and Relationship to Patient    ..................................................               ................................................  Date                                   Time    ..........................................................................................................................................  Reviewed by Signature/Title    ...................................................              ..............................................  Date                                               Time          22EPIC Rev 08/18

## 2019-05-18 NOTE — ED NOTES
Bed: ED02  Expected date: 5/18/19  Expected time: 5:48 PM  Means of arrival: Ambulance  Comments:  934 42m CP ETA 3074

## 2019-05-18 NOTE — ED TRIAGE NOTES
"Pt c/o chest pain also reports increased stress. Was evaluated at clinic today and given 324 mg ASA. 1 dose of nitroglycerin given en route with no resolution of symptoms. Denies recent illness, reports \"possible heartburn- burning in chest x 5 days\", tums improved symptoms.   "

## 2019-05-30 ENCOUNTER — OFFICE VISIT (OUTPATIENT)
Dept: FAMILY MEDICINE | Facility: CLINIC | Age: 44
End: 2019-05-30
Payer: COMMERCIAL

## 2019-05-30 VITALS
HEART RATE: 66 BPM | DIASTOLIC BLOOD PRESSURE: 88 MMHG | HEIGHT: 72 IN | BODY MASS INDEX: 33.25 KG/M2 | SYSTOLIC BLOOD PRESSURE: 132 MMHG | OXYGEN SATURATION: 99 % | WEIGHT: 245.5 LBS

## 2019-05-30 DIAGNOSIS — I10 BENIGN ESSENTIAL HYPERTENSION: Primary | ICD-10-CM

## 2019-05-30 DIAGNOSIS — K21.9 GASTROESOPHAGEAL REFLUX DISEASE, ESOPHAGITIS PRESENCE NOT SPECIFIED: ICD-10-CM

## 2019-05-30 PROCEDURE — 99213 OFFICE O/P EST LOW 20 MIN: CPT | Performed by: PHYSICIAN ASSISTANT

## 2019-05-30 ASSESSMENT — MIFFLIN-ST. JEOR: SCORE: 2042.61

## 2019-05-30 NOTE — PROGRESS NOTES
"Subjective     Ramírez Epperson is a 43 year old male who presents to clinic today for the following health issues:    HPI     ED/UC Followup:    Facility:  [facility]  Date of visit: 05/13/2019  Reason for visit: chest pain, GERD  Current Status: improvement with lisinopril and omeprazole     44 y/o male here for follow up ED visit. He was having some severe chest pain, so ENT did take him to ED for evaluation.  Work up did not reveal cardiac cause, and more of GI/reflux in nature.  They started him on carafate and prilosec.  He never started the carafate, and he is much improved.  Is taking the prilosec.  He has also made some changes to diet to lessen reflux.      BP Readings from Last 3 Encounters:   05/30/19 132/88   05/18/19 (!) 152/102   12/19/18 (!) 139/94    Wt Readings from Last 3 Encounters:   05/30/19 111.4 kg (245 lb 8 oz)   05/18/19 115.7 kg (255 lb)   12/19/18 114.8 kg (253 lb)                    Hypertension Follow-up      Do you check your blood pressure regularly outside of the clinic? No     Are you following a low salt diet? Yes    Are your blood pressures ever more than 140 on the top number (systolic) OR more   than 90 on the bottom number (diastolic), for example 140/90? No  Reviewed and updated as needed this visit by Provider         Review of Systems   ROS COMP: Constitutional, HEENT, cardiovascular, pulmonary, gi and gu systems are negative, except as otherwise noted.      Objective    /88   Pulse 66   Ht 1.822 m (5' 11.75\")   Wt 111.4 kg (245 lb 8 oz)   SpO2 99%   BMI 33.53 kg/m    Body mass index is 33.53 kg/m .  Physical Exam   GENERAL: alert and no distress  EYES: Eyes grossly normal to inspection  HENT: ear canals and TM's normal, nose and mouth without ulcers or lesions  RESP: lungs clear to auscultation - no rales, rhonchi or wheezes  CV: regular rate and rhythm, normal S1 S2, no S3 or S4, no murmur, click or rub, no peripheral edema and peripheral pulses " "strong  PSYCH: mentation appears normal, affect normal/bright    Diagnostic Test Results:  Labs reviewed in Epic        Assessment & Plan     1. Benign essential hypertension  At goal, will continue lisinopril    2. Gastroesophageal reflux disease, esophagitis presence not specified  Improved on prilosec.  Will stay on for the next couple  Of weeks, then taper off.  Has been making some dietary changes as well.       BMI:   Estimated body mass index is 33.53 kg/m  as calculated from the following:    Height as of this encounter: 1.822 m (5' 11.75\").    Weight as of this encounter: 111.4 kg (245 lb 8 oz).               Return in about 1 month (around 6/27/2019) for If symptoms persist or worsen.    Paul Aguilar PA-C  [facility]        "

## 2019-05-30 NOTE — NURSING NOTE
"Chief Complaint   Patient presents with     ER F/U     /88   Pulse 66   Ht 1.822 m (5' 11.75\")   Wt 111.4 kg (245 lb 8 oz)   SpO2 99%   BMI 33.53 kg/m   Estimated body mass index is 33.53 kg/m  as calculated from the following:    Height as of this encounter: 1.822 m (5' 11.75\").    Weight as of this encounter: 111.4 kg (245 lb 8 oz).        Health Maintenance due pending provider review:  NONE    n/a    Maria E Gong CMA  "

## 2019-08-15 DIAGNOSIS — I10 BENIGN ESSENTIAL HYPERTENSION: ICD-10-CM

## 2019-08-15 RX ORDER — LISINOPRIL 10 MG/1
TABLET ORAL
Qty: 90 TABLET | Refills: 0 | Status: SHIPPED | OUTPATIENT
Start: 2019-08-15

## 2019-08-15 NOTE — TELEPHONE ENCOUNTER
"Prescription approved per [facility] Refill Protocol.  Keyla Palmer RN      Requested Prescriptions   Pending Prescriptions Disp Refills     lisinopril (PRINIVIL/ZESTRIL) 10 MG tablet [Pharmacy Med Name: LISINOPRIL 10 MG TABLET] 90 tablet 0     Sig: TAKE 1 TABLET BY MOUTH EVERY DAY       ACE Inhibitors (Including Combos) Protocol Passed - 8/15/2019  2:26 PM        Passed - Blood pressure under 140/90 in past 12 months     BP Readings from Last 3 Encounters:   05/30/19 132/88   05/18/19 (!) 152/102   12/19/18 (!) 139/94                 Passed - Recent (12 mo) or future (30 days) visit within the authorizing provider's specialty     Patient had office visit in the last 12 months or has a visit in the next 30 days with authorizing provider or within the authorizing provider's specialty.  See \"Patient Info\" tab in inbasket, or \"Choose Columns\" in Meds & Orders section of the refill encounter.              Passed - Medication is active on med list        Passed - Patient is age 18 or older        Passed - Normal serum creatinine on file in past 12 months     Recent Labs   Lab Test 05/18/19  1831   CR 1.16             Passed - Normal serum potassium on file in past 12 months     Recent Labs   Lab Test 05/18/19  1831   POTASSIUM 3.8             "

## 2019-08-19 DIAGNOSIS — I10 BENIGN ESSENTIAL HYPERTENSION: ICD-10-CM

## 2019-08-20 RX ORDER — LISINOPRIL 10 MG/1
TABLET ORAL
Start: 2019-08-20

## 2019-08-20 NOTE — TELEPHONE ENCOUNTER
"Last Written Prescription Date:  8/15/2019  Last Fill Quantity: 90,  # refills: 0   Last office visit: 5/30/2019 with prescribing provider:  chucho   Future Office Visit:      Requested Prescriptions   Pending Prescriptions Disp Refills     lisinopril (PRINIVIL/ZESTRIL) 10 MG tablet [Pharmacy Med Name: LISINOPRIL 10 MG TABLET] 90 tablet 0     Sig: TAKE 1 TABLET BY MOUTH EVERY DAY       ACE Inhibitors (Including Combos) Protocol Passed - 8/19/2019  1:45 PM        Passed - Blood pressure under 140/90 in past 12 months     BP Readings from Last 3 Encounters:   05/30/19 132/88   05/18/19 (!) 152/102   12/19/18 (!) 139/94                 Passed - Recent (12 mo) or future (30 days) visit within the authorizing provider's specialty     Patient had office visit in the last 12 months or has a visit in the next 30 days with authorizing provider or within the authorizing provider's specialty.  See \"Patient Info\" tab in inbasket, or \"Choose Columns\" in Meds & Orders section of the refill encounter.              Passed - Medication is active on med list        Passed - Patient is age 18 or older        Passed - Normal serum creatinine on file in past 12 months     Recent Labs   Lab Test 05/18/19  1831   CR 1.16             Passed - Normal serum potassium on file in past 12 months     Recent Labs   Lab Test 05/18/19  1831   POTASSIUM 3.8               "